# Patient Record
Sex: FEMALE | Race: WHITE | Employment: UNEMPLOYED | URBAN - METROPOLITAN AREA
[De-identification: names, ages, dates, MRNs, and addresses within clinical notes are randomized per-mention and may not be internally consistent; named-entity substitution may affect disease eponyms.]

---

## 2018-07-21 ENCOUNTER — HOSPITAL ENCOUNTER (EMERGENCY)
Age: 23
Discharge: HOME | End: 2018-07-21
Attending: STUDENT IN AN ORGANIZED HEALTH CARE EDUCATION/TRAINING PROGRAM | Admitting: STUDENT IN AN ORGANIZED HEALTH CARE EDUCATION/TRAINING PROGRAM
Payer: SELF-PAY

## 2018-07-21 VITALS
HEART RATE: 70 BPM | TEMPERATURE: 98.2 F | RESPIRATION RATE: 16 BRPM | SYSTOLIC BLOOD PRESSURE: 124 MMHG | DIASTOLIC BLOOD PRESSURE: 80 MMHG | BODY MASS INDEX: 23.83 KG/M2 | WEIGHT: 126.2 LBS | HEIGHT: 61 IN | OXYGEN SATURATION: 100 %

## 2018-07-21 DIAGNOSIS — Z71.1 CONCERN ABOUT STD IN FEMALE WITHOUT DIAGNOSIS: Primary | ICD-10-CM

## 2018-07-21 LAB
ERYTHROCYTE [DISTWIDTH] IN BLOOD BY AUTOMATED COUNT: 12.8 % (ref 11.5–14.5)
HCG UR QL: NEGATIVE
HCT VFR BLD AUTO: 38.5 % (ref 35–47)
HGB BLD-MCNC: 13 G/DL (ref 11.5–16)
MCH RBC QN AUTO: 30.7 PG (ref 26–34)
MCHC RBC AUTO-ENTMCNC: 33.8 G/DL (ref 30–36.5)
MCV RBC AUTO: 90.8 FL (ref 80–99)
NRBC # BLD: 0 K/UL (ref 0–0.01)
NRBC BLD-RTO: 0 PER 100 WBC
PLATELET # BLD AUTO: 290 K/UL (ref 150–400)
PMV BLD AUTO: 10.3 FL (ref 8.9–12.9)
RBC # BLD AUTO: 4.24 M/UL (ref 3.8–5.2)
WBC # BLD AUTO: 7.2 K/UL (ref 3.6–11)

## 2018-07-21 PROCEDURE — 36600 WITHDRAWAL OF ARTERIAL BLOOD: CPT

## 2018-07-21 PROCEDURE — 87529 HSV DNA AMP PROBE: CPT | Performed by: STUDENT IN AN ORGANIZED HEALTH CARE EDUCATION/TRAINING PROGRAM

## 2018-07-21 PROCEDURE — 36415 COLL VENOUS BLD VENIPUNCTURE: CPT | Performed by: STUDENT IN AN ORGANIZED HEALTH CARE EDUCATION/TRAINING PROGRAM

## 2018-07-21 PROCEDURE — 81025 URINE PREGNANCY TEST: CPT

## 2018-07-21 PROCEDURE — 85027 COMPLETE CBC AUTOMATED: CPT | Performed by: STUDENT IN AN ORGANIZED HEALTH CARE EDUCATION/TRAINING PROGRAM

## 2018-07-21 PROCEDURE — 99284 EMERGENCY DEPT VISIT MOD MDM: CPT

## 2018-07-21 RX ORDER — ACYCLOVIR 400 MG/1
400 TABLET ORAL
Qty: 35 TAB | Refills: 0 | Status: SHIPPED | OUTPATIENT
Start: 2018-07-21 | End: 2018-07-28

## 2018-07-21 NOTE — ED TRIAGE NOTES
Pt reports exposure to herpes 7/8/2018. Test negative on 7/12 but had elevated wbc. Pt reports lips are tingling and wants to be retested.

## 2018-07-21 NOTE — ED PROVIDER NOTES
HPI Comments: 21 y.o. female with past medical history significant for anxiety who presents from home via private vehicle with chief complaint of skin problem. Pt states she was exposed to herpes via a sexual encounter on 7/8, and was tested negative on 7/12. Pt notes at that time she was found to have an elevated WBC count, but was told it could be a recent hornet sting. Pt reports last night she started with \"bumps\" on her lips that are burning and tingling. Pt reports concern that she has herpes and would like to be retested. Pt notes she is in the sun a lot, and states it might be from prolonged sun exposure. Pt reports having an IUD in place, is currently on her cycle. Pt notes she is a patient at the [facility], and has a Naltrexone implant for detox. Pt denies any genital lesions. There are no other acute medical concerns at this time. Social hx: Nonsmoker; No EtOH use    Note written by Marcio Jones, as dictated by Vivien Lieberman MD 8:11 AM    The history is provided by the patient. No  was used. Past Medical History:   Diagnosis Date    Anxiety        History reviewed. No pertinent surgical history. History reviewed. No pertinent family history. Social History     Social History    Marital status: N/A     Spouse name: N/A    Number of children: N/A    Years of education: N/A     Occupational History    Not on file. Social History Main Topics    Smoking status: Never Smoker    Smokeless tobacco: Never Used    Alcohol use No    Drug use: No    Sexual activity: Not on file     Other Topics Concern    Not on file     Social History Narrative    No narrative on file         ALLERGIES: Neosporin [hydrocortisone]    Review of Systems   Constitutional: Negative for chills and fever. HENT: Negative for sore throat. Respiratory: Negative for cough and shortness of breath. Cardiovascular: Negative for chest pain.    Gastrointestinal: Negative for abdominal pain and vomiting. Genitourinary: Negative for dysuria. Musculoskeletal: Negative for back pain. Skin: Negative for rash. (+) Bumps on upper lip   Neurological: Negative for syncope and headaches. Psychiatric/Behavioral: Negative for confusion. All other systems reviewed and are negative. Vitals:    07/21/18 0749   BP: 125/70   Pulse: 61   Resp: 16   Temp: 98.1 °F (36.7 °C)   SpO2: 98%   Weight: 57.2 kg (126 lb 3.2 oz)   Height: 5' 1\" (1.549 m)            Physical Exam   Constitutional: She is oriented to person, place, and time. She appears well-developed. No distress. HENT:   Head: Normocephalic and atraumatic. No obvious lesions. Eyes: Conjunctivae and EOM are normal. Pupils are equal, round, and reactive to light. Neck: Normal range of motion. Neck supple. Cardiovascular: Normal rate, regular rhythm and normal heart sounds. No murmur heard. Pulmonary/Chest: Effort normal and breath sounds normal. No respiratory distress. Abdominal: Soft. Bowel sounds are normal. She exhibits no distension. There is no tenderness. There is no rebound. Musculoskeletal: Normal range of motion. She exhibits no edema. Neurological: She is alert and oriented to person, place, and time. No cranial nerve deficit. She exhibits normal muscle tone. Coordination normal.   Skin: Skin is warm and dry. No rash noted. Psychiatric: She has a normal mood and affect. Her behavior is normal.   Nursing note and vitals reviewed.      Note written by Marcio Anne, as dictated by Yuan Murdock MD 8:11 AM    [facility]      ED Course       Procedures

## 2018-07-21 NOTE — ED NOTES
Pt requesting MD to re-evaluate her lip claiming \"its getting bigger just sitting here. \"  Notified MD.  Also notified ER MD multiple PIV blood draw for CBC without success from 3 different nurses.

## 2018-07-22 LAB
HSV1 DNA SPEC QL NAA+PROBE: NEGATIVE
HSV2 DNA SPEC QL NAA+PROBE: NEGATIVE
SPECIMEN SOURCE: NORMAL

## 2018-09-10 ENCOUNTER — HOSPITAL ENCOUNTER (EMERGENCY)
Age: 23
Discharge: HOME | End: 2018-09-10
Attending: EMERGENCY MEDICINE | Admitting: EMERGENCY MEDICINE
Payer: SELF-PAY

## 2018-09-10 VITALS
TEMPERATURE: 98.3 F | HEIGHT: 61 IN | BODY MASS INDEX: 24.58 KG/M2 | RESPIRATION RATE: 16 BRPM | SYSTOLIC BLOOD PRESSURE: 116 MMHG | OXYGEN SATURATION: 100 % | WEIGHT: 130.2 LBS | HEART RATE: 73 BPM | DIASTOLIC BLOOD PRESSURE: 78 MMHG

## 2018-09-10 DIAGNOSIS — K92.0 HEMATEMESIS, PRESENCE OF NAUSEA NOT SPECIFIED: Primary | ICD-10-CM

## 2018-09-10 PROCEDURE — 74011250637 HC RX REV CODE- 250/637: Performed by: EMERGENCY MEDICINE

## 2018-09-10 PROCEDURE — 74011000250 HC RX REV CODE- 250: Performed by: EMERGENCY MEDICINE

## 2018-09-10 PROCEDURE — 99283 EMERGENCY DEPT VISIT LOW MDM: CPT

## 2018-09-10 RX ORDER — ONDANSETRON 4 MG/1
4 TABLET, ORALLY DISINTEGRATING ORAL
Qty: 12 TAB | Refills: 0 | Status: SHIPPED | OUTPATIENT
Start: 2018-09-10 | End: 2018-11-07

## 2018-09-10 RX ORDER — ONDANSETRON 4 MG/1
4 TABLET, ORALLY DISINTEGRATING ORAL
Status: COMPLETED | OUTPATIENT
Start: 2018-09-10 | End: 2018-09-10

## 2018-09-10 RX ORDER — FAMOTIDINE 20 MG/1
20 TABLET, FILM COATED ORAL
Status: COMPLETED | OUTPATIENT
Start: 2018-09-10 | End: 2018-09-10

## 2018-09-10 RX ORDER — SUCRALFATE 1 G/10ML
1 SUSPENSION ORAL 4 TIMES DAILY
Qty: 400 ML | Refills: 0 | Status: SHIPPED | OUTPATIENT
Start: 2018-09-10 | End: 2018-09-20

## 2018-09-10 RX ADMIN — LIDOCAINE HYDROCHLORIDE 40 ML: 20 SOLUTION ORAL; TOPICAL at 10:14

## 2018-09-10 RX ADMIN — ONDANSETRON 4 MG: 4 TABLET, ORALLY DISINTEGRATING ORAL at 10:14

## 2018-09-10 RX ADMIN — FAMOTIDINE 20 MG: 20 TABLET ORAL at 10:14

## 2018-09-10 NOTE — ED NOTES
Discharge instructionsgiven to patient by provider with opportunity to ask questions. Pt verbalizing understanding. VSS. NAD. Pt left ED ambualtory

## 2018-09-10 NOTE — LETTER
NOTIFICATION RETURN TO WORK / SCHOOL 
 
9/10/2018 10:42 AM 
 
Ms. Porsha Chandler [place] [id number] [facility] [id number] To Whom It May Concern: 
 
Porsha Chandler is currently under the care of [address]. She will return to work/school on: 9/12/18 If there are questions or concerns please have the patient contact our office. Sincerely, Barbara Rowland NP

## 2018-09-10 NOTE — DISCHARGE INSTRUCTIONS
Upper Gastrointestinal Bleeding: Care Instructions  Your Care Instructions    The digestive or gastrointestinal tract goes from the mouth to the anus. It is often called the GI tract. Bleeding in the upper GI tract can happen anywhere from the esophagus to the first part of the small intestine. Sometimes it's caused by an ulcer in your stomach. Or it may be caused by blood vessels in your esophagus. Your esophagus is the tube that carries food from your throat to your stomach. Light bleeding may not cause any symptoms at first. But if you continue to bleed for a while, you may feel very weak or tired. Sudden, heavy bleeding means you need to see a doctor right away. This kind of bleeding can be very dangerous. But it can usually be cured or controlled. The doctor may do some tests to find the cause of your bleeding. Follow-up care is a key part of your treatment and safety. Be sure to make and go to all appointments, and call your doctor if you are having problems. It's also a good idea to know your test results and keep a list of the medicines you take. How can you care for yourself at home? · Be safe with medicines. Take your medicines exactly as prescribed. Call your doctor if you think you are having a problem with your medicine. You will get more details on the specific medicines your doctor prescribes. · Do not take aspirin or other anti-inflammatory medicines, such as naproxen (Aleve) or ibuprofen (Advil, Motrin), without talking to your doctor first. Ask your doctor if it is okay to use acetaminophen (Tylenol). · Do not drink alcohol. · The bleeding may make you lose iron. So it's important to eat foods that have a lot of iron. These include red meat, shellfish, poultry, and eggs. They also include beans, raisins, whole-grain breads, and leafy green vegetables. If you want help planning meals, you can meet with a dietitian. When should you call for help?   Call 911 anytime you think you may need emergency care. For example, call if:    · You have sudden, severe belly pain.     · You vomit blood or what looks like coffee grounds.     · You passed out (lost consciousness).     · Your stools are maroon or very bloody.    Call your doctor now or seek immediate medical care if:    · You are dizzy or lightheaded, or you feel like you may faint.     · Your stools are black and look like tar.     · You have belly pain.     · You vomit or have nausea.     · You have trouble swallowing, or it hurts when you swallow.    Watch closely for changes in your health, and be sure to contact your doctor if you do not get better as expected. Where can you learn more? Go to http://rigo-clarke.info/. Enter F079 in the search box to learn more about \"Upper Gastrointestinal Bleeding: Care Instructions. \"  Current as of: November 20, 2017  Content Version: 11.7  © 1888-1167 [employer]. Care instructions adapted under license by [employer] (which disclaims liability or warranty for this information). If you have questions about a medical condition or this instruction, always ask your healthcare professional. [place] [address] any warranty or liability for your use of this information.

## 2018-09-10 NOTE — LETTER
NOTIFICATION RETURN TO WORK / SCHOOL 
 
9/10/2018 10:41 AM 
 
Ms. Allyssa Melo [address] [id number] To Whom It May Concern: 
 
Allyssa Melo is currently under the care of [address]. She will return to work/school on: 9/11/18 If there are questions or concerns please have the patient contact our office. Sincerely, Chelsy Andrade NP

## 2018-09-10 NOTE — ED PROVIDER NOTES
Patient is a 21 y.o. female presenting with vomiting. Vomiting Blood Pertinent negatives include no abdominal pain, no diarrhea, no headaches and no headaches. Pt states that she had too much to drink last night and has has multiple episodes of vomiting this morning; the last vomiting episode she states leonard streaks of blood. Denies fever, cough, cold symptoms, neck pain, visual changes, focal weakness or rash. Denies any difficulty breathing, difficulty swallowing, SOB or chest pain. Denies any urinary symptoms, constipation or diarrhea. Pt. Reports that she has not had any food or medications since yesterday. Past Medical History:  
Diagnosis Date  Alcohol poisoning  Anxiety History reviewed. No pertinent surgical history. History reviewed. No pertinent family history. Social History Social History  Marital status: SINGLE Spouse name: N/A  
 Number of children: N/A  
 Years of education: N/A Occupational History  Not on file. Social History Main Topics  Smoking status: Never Smoker  Smokeless tobacco: Never Used  Alcohol use Yes Comment: \"too much\"  Drug use: Yes Special: Marijuana Comment: last use 9/9/2018  Sexual activity: Not on file Other Topics Concern  Not on file Social History Narrative ALLERGIES: Neosporin [hydrocortisone] Review of Systems Constitutional: Negative for activity change and appetite change. HENT: Negative for facial swelling, sore throat and trouble swallowing. Eyes: Negative. Respiratory: Negative for shortness of breath. Cardiovascular: Negative. Gastrointestinal: Positive for nausea and vomiting. Negative for abdominal pain and diarrhea. Genitourinary: Negative for dysuria. Musculoskeletal: Negative for back pain and neck pain. Skin: Negative for color change. Neurological: Negative for headaches. Psychiatric/Behavioral: Negative. Vitals: 09/10/18 1747 Pulse: 67 Resp: 18 Temp: 97.9 °F (36.6 °C) SpO2: 99% Weight: 59.1 kg (130 lb 3.2 oz) Height: 5' 1\" (1.549 m) Physical Exam  
Constitutional: She is oriented to person, place, and time. She appears well-nourished. White female; smoker;  HENT:  
Head: Normocephalic. Right Ear: External ear normal.  
Left Ear: External ear normal.  
Mouth/Throat: Oropharynx is clear and moist.  
Eyes: Conjunctivae and EOM are normal. Pupils are equal, round, and reactive to light. Neck: Normal range of motion. Neck supple. Cardiovascular: Normal rate and regular rhythm. Pulmonary/Chest: Effort normal and breath sounds normal.  
Abdominal: Soft. Bowel sounds are normal. She exhibits no distension and no mass. There is no tenderness. There is no rebound and no guarding. Musculoskeletal: Normal range of motion. Lymphadenopathy:  
  She has no cervical adenopathy. Neurological: She is alert and oriented to person, place, and time. Skin: Skin is warm and dry. No rash noted. Nursing note and vitals reviewed. MDM 
 
 
ED Course Procedures Pt has been re-examined and is tolerating fluids well. Encouraged close follow up with gastroenterology. 10:35 AM 
Patient's results and plan of care have been reviewed with her. Patient has verbally conveyed her understanding and agreement of her signs, symptoms, diagnosis, treatment and prognosis and additionally agrees to follow up as recommended or return to the Emergency Room should her condition change prior to follow-up. Discharge instructions have also been provided to the patient with some educational information regarding her diagnosis as well a list of reasons why she would want to return to the ER prior to her follow-up appointment should her condition change. Discussed plan of care with Dr. Bobbi Hall.  Kimber Gurrola NP

## 2018-09-10 NOTE — ED TRIAGE NOTES
Triage note: \"I think I may have given myself alcohol poisoning again. I started vomiting blood this morning. \"  Pt states she drank 4-5 rum runners last night.

## 2018-11-07 ENCOUNTER — HOSPITAL ENCOUNTER (EMERGENCY)
Age: 23
Discharge: HOME | End: 2018-11-07
Attending: EMERGENCY MEDICINE
Payer: COMMERCIAL

## 2018-11-07 VITALS
HEART RATE: 84 BPM | TEMPERATURE: 98.3 F | BODY MASS INDEX: 24.55 KG/M2 | RESPIRATION RATE: 16 BRPM | HEIGHT: 61 IN | SYSTOLIC BLOOD PRESSURE: 93 MMHG | DIASTOLIC BLOOD PRESSURE: 58 MMHG | OXYGEN SATURATION: 100 % | WEIGHT: 130 LBS

## 2018-11-07 DIAGNOSIS — R11.2 NON-INTRACTABLE VOMITING WITH NAUSEA, UNSPECIFIED VOMITING TYPE: Primary | ICD-10-CM

## 2018-11-07 PROCEDURE — 99283 EMERGENCY DEPT VISIT LOW MDM: CPT

## 2018-11-07 PROCEDURE — 74011250637 HC RX REV CODE- 250/637: Performed by: EMERGENCY MEDICINE

## 2018-11-07 RX ORDER — ONDANSETRON 4 MG/1
8 TABLET, ORALLY DISINTEGRATING ORAL
Status: COMPLETED | OUTPATIENT
Start: 2018-11-07 | End: 2018-11-07

## 2018-11-07 RX ORDER — ONDANSETRON 2 MG/ML
4 INJECTION INTRAMUSCULAR; INTRAVENOUS
Status: DISCONTINUED | OUTPATIENT
Start: 2018-11-07 | End: 2018-11-07

## 2018-11-07 RX ORDER — IBUPROFEN 600 MG/1
600 TABLET ORAL
Status: COMPLETED | OUTPATIENT
Start: 2018-11-07 | End: 2018-11-07

## 2018-11-07 RX ORDER — ONDANSETRON 8 MG/1
8 TABLET, ORALLY DISINTEGRATING ORAL
Qty: 12 TAB | Refills: 0 | Status: SHIPPED | OUTPATIENT
Start: 2018-11-07

## 2018-11-07 RX ORDER — DICYCLOMINE HYDROCHLORIDE 10 MG/1
20 CAPSULE ORAL
Status: COMPLETED | OUTPATIENT
Start: 2018-11-07 | End: 2018-11-07

## 2018-11-07 RX ORDER — KETOROLAC TROMETHAMINE 30 MG/ML
15 INJECTION, SOLUTION INTRAMUSCULAR; INTRAVENOUS
Status: DISCONTINUED | OUTPATIENT
Start: 2018-11-07 | End: 2018-11-07

## 2018-11-07 RX ORDER — DICYCLOMINE HYDROCHLORIDE 10 MG/1
10 CAPSULE ORAL 4 TIMES DAILY
Qty: 20 CAP | Refills: 0 | Status: SHIPPED | OUTPATIENT
Start: 2018-11-07 | End: 2018-11-12

## 2018-11-07 RX ADMIN — ONDANSETRON 8 MG: 4 TABLET, ORALLY DISINTEGRATING ORAL at 02:19

## 2018-11-07 RX ADMIN — IBUPROFEN 600 MG: 600 TABLET ORAL at 02:34

## 2018-11-07 RX ADMIN — DICYCLOMINE HYDROCHLORIDE 20 MG: 10 CAPSULE ORAL at 02:34

## 2018-11-07 NOTE — ED NOTES
Pt arrived to ED with c/o abd pain with vomiting since today. Pt states \"I think its food poisoning\" Pt admits to eating at [address] recently. Pt reports last episode of vomiting was 10 mins PTA. Pt is in no acute distress. Will continue to monitor. See nursing assessment. Safety precautions in place; call light within reach. Emergency Department Nursing Plan of Care The Nursing Plan of Care is developed from the Nursing assessment and Emergency Department Attending provider initial evaluation. The plan of care may be reviewed in the ED Provider note. The Plan of Care was developed with the following considerations:  
Patient / Family readiness to learn indicated by:verbalized understanding Persons(s) to be included in education: patient Barriers to Learning/Limitations:No 
 
Signed Lisa Bravo RN   
11/7/2018   1:54 AM

## 2018-11-07 NOTE — ED PROVIDER NOTES
EMERGENCY DEPARTMENT HISTORY AND PHYSICAL EXAM 
 
 
Date: 11/7/2018 Patient Name: Ghada Villanueva History of Presenting Illness Chief Complaint Patient presents with  Vomiting History Provided By: Patient HPI: Ghada Villanueva, 21 y.o. female with PMHx significant for anxiety, presents ambulatory to the ED with cc of intermittent episodes of NV that woke her up from sleep tonight. Pt states last episodes was shortly PTA. She reports associated mild abd cramping. She denies any alleviating or exacerbating factors. She denies recent sick contact with similar symptoms. She states she had [employer] for dinner and thinks symptoms may be attributed to food poisoning. She denies recent marijuana use. She notes she has an IUD and denies chance of pregnancy. Pt specifically denies any fever, chills, cough, congestion, diarrhea. There are no other complaints, changes, or physical findings at this time. PCP: Kelley, MD Rusty 
 
Current Facility-Administered Medications Medication Dose Route Frequency Provider Last Rate Last Dose  ondansetron (ZOFRAN ODT) tablet 8 mg  8 mg Oral NOW [facility], DO      
 dicyclomine (BENTYL) capsule 20 mg  20 mg Oral NOW [facility], DO      
 ibuprofen (MOTRIN) tablet 600 mg  600 mg Oral NOW [facility], DO      
 
Current Outpatient Medications Medication Sig Dispense Refill  copper (PARAGARD T 380A IU) by IntraUTERine route. Past History Past Medical History: 
Past Medical History:  
Diagnosis Date  Alcohol poisoning  Anxiety Past Surgical History: 
History reviewed. No pertinent surgical history. Family History: 
History reviewed. No pertinent family history. Social History: 
Social History Tobacco Use  Smoking status: Never Smoker  Smokeless tobacco: Never Used Substance Use Topics  Alcohol use: Yes Comment: \"too much\"  Drug use: Yes Types: Marijuana Allergies: Allergies Allergen Reactions  Neosporin [Hydrocortisone] Rash Review of Systems Review of Systems Constitutional: Negative for chills and fever. HENT: Negative for congestion and sore throat. Eyes: Negative for visual disturbance. Respiratory: Negative for cough and shortness of breath. Cardiovascular: Negative for chest pain and leg swelling. Gastrointestinal: Positive for abdominal pain, nausea and vomiting. Negative for blood in stool and diarrhea. Endocrine: Negative for polyuria. Genitourinary: Negative for dysuria, flank pain, vaginal bleeding and vaginal discharge. Musculoskeletal: Negative for myalgias. Skin: Negative for rash. Allergic/Immunologic: Negative for immunocompromised state. Neurological: Negative for weakness and headaches. Psychiatric/Behavioral: Negative for confusion. Physical Exam  
Physical Exam  
Constitutional: She is oriented to person, place, and time. She appears well-developed and well-nourished. HENT:  
Head: Normocephalic and atraumatic. Moist mucous membranes Eyes: Conjunctivae are normal. Pupils are equal, round, and reactive to light. Right eye exhibits no discharge. Left eye exhibits no discharge. Neck: Normal range of motion. Neck supple. No tracheal deviation present. Cardiovascular: Normal rate, regular rhythm and normal heart sounds. No murmur heard. Pulmonary/Chest: Effort normal and breath sounds normal. No respiratory distress. She has no wheezes. She has no rales. Abdominal: Soft. Bowel sounds are normal. There is no tenderness. There is no rebound and no guarding. Musculoskeletal: Normal range of motion. She exhibits no edema, tenderness or deformity. Neurological: She is alert and oriented to person, place, and time. Skin: Skin is warm and dry. No rash noted. No erythema. Psychiatric: Her behavior is normal.  
Nursing note and vitals reviewed. Diagnostic Study Results Labs - 
 No results found for this or any previous visit (from the past 12 hour(s)). Radiologic Studies - No orders to display CT Results  (Last 48 hours) None CXR Results  (Last 48 hours) None Medical Decision Making I am the first provider for this patient. I reviewed the vital signs, available nursing notes, past medical history, past surgical history, family history and social history. Vital Signs-Reviewed the patient's vital signs. Patient Vitals for the past 12 hrs: 
 Temp Pulse Resp BP SpO2  
11/07/18 0146 98.3 °F (36.8 °C) 84 16 93/58 100 % Records Reviewed: Nursing Notes and Old Medical Records Provider Notes (Medical Decision Making): Consistent with viral gastritis vs pancreatitis vs food poisoning vs cannabinoid hyperemesis. Patient non toxic, just started vomiting. No labs or imaging warranted at this time. Will treat symptoms, perform serial exam, discharge if symptoms resolve with symptomatic treatment and close return precautions. ED Course:  
Initial assessment performed. The patients presenting problems have been discussed, and they are in agreement with the care plan formulated and outlined with them. I have encouraged them to ask questions as they arise throughout their visit. 
 
3:06 AM 
Patient feeling better. Will PO trial. If PO trial successful patient can be discharged to home. Critical Care Time:  
none Disposition: 
DISCHARGE NOTE The patient has been re-evaluated and is ready for discharge. Reviewed available results with patient. Counseled pt on diagnosis and care plan. Pt has expressed understanding, and all questions have been answered. Pt agrees with plan and agrees to follow up as recommended, or return to the ED if their symptoms worsen. Discharge instructions have been provided and explained to the pt, along with reasons to return to the ED. PLAN: 
1. Current Discharge Medication List  
  
 
2. Follow-up Information None Return to ED if worse Diagnosis Clinical Impression: 1. Non-intractable vomiting with nausea, unspecified vomiting type Attestations: This note is prepared by Julita Tanner, acting as Scribe for Dorcas Villanueva DO. Dorcas Villanueva DO: The scribe's documentation has been prepared under my direction and personally reviewed by me in its entirety. I confirm that the note above accurately reflects all work, treatment, procedures, and medical decision making performed by me.

## 2018-11-07 NOTE — DISCHARGE INSTRUCTIONS
Nausea and Vomiting: Care Instructions  Your Care Instructions    When you are nauseated, you may feel weak and sweaty and notice a lot of saliva in your mouth. Nausea often leads to vomiting. Most of the time you do not need to worry about nausea and vomiting, but they can be signs of other illnesses. Two common causes of nausea and vomiting are stomach flu and food poisoning. Nausea and vomiting from viral stomach flu will usually start to improve within 24 hours. Nausea and vomiting from food poisoning may last from 12 to 48 hours. The doctor has checked you carefully, but problems can develop later. If you notice any problems or new symptoms, get medical treatment right away. Follow-up care is a key part of your treatment and safety. Be sure to make and go to all appointments, and call your doctor if you are having problems. It's also a good idea to know your test results and keep a list of the medicines you take. How can you care for yourself at home? · To prevent dehydration, drink plenty of fluids, enough so that your urine is light yellow or clear like water. Choose water and other caffeine-free clear liquids until you feel better. If you have kidney, heart, or liver disease and have to limit fluids, talk with your doctor before you increase the amount of fluids you drink. · Rest in bed until you feel better. · When you are able to eat, try clear soups, mild foods, and liquids until all symptoms are gone for 12 to 48 hours. Other good choices include dry toast, crackers, cooked cereal, and gelatin dessert, such as Jell-O. When should you call for help? Call 911 anytime you think you may need emergency care. For example, call if:    · You passed out (lost consciousness).    Call your doctor now or seek immediate medical care if:    · You have symptoms of dehydration, such as:  ? Dry eyes and a dry mouth. ? Passing only a little dark urine. ?  Feeling thirstier than usual.     · You have new or worsening belly pain.     · You have a new or higher fever.     · You vomit blood or what looks like coffee grounds.    Watch closely for changes in your health, and be sure to contact your doctor if:    · You have ongoing nausea and vomiting.     · Your vomiting is getting worse.     · Your vomiting lasts longer than 2 days.     · You are not getting better as expected. Where can you learn more? Go to http://rigo-clarke.info/. Enter 25 [id number] in the search box to learn more about \"Nausea and Vomiting: Care Instructions. \"  Current as of: November 20, 2017  Content Version: 11.8  © 5821-6442 [employer]. Care instructions adapted under license by [employer] (which disclaims liability or warranty for this information). If you have questions about a medical condition or this instruction, always ask your healthcare professional. [address] any warranty or liability for your use of this information.

## 2018-11-07 NOTE — ED NOTES
Dr Refugio Hussein at bedside reviewing patient's discharge instructions and reviewing medications. Patient ambulatory home. Patient in no apparent distress.

## 2019-05-24 ENCOUNTER — OFFICE VISIT (OUTPATIENT)
Dept: OBGYN CLINIC | Age: 24
End: 2019-05-24

## 2019-05-24 VITALS
DIASTOLIC BLOOD PRESSURE: 62 MMHG | SYSTOLIC BLOOD PRESSURE: 130 MMHG | HEIGHT: 61 IN | BODY MASS INDEX: 24.13 KG/M2 | WEIGHT: 127.8 LBS

## 2019-05-24 DIAGNOSIS — R10.2 PELVIC PAIN IN FEMALE: ICD-10-CM

## 2019-05-24 DIAGNOSIS — R30.9 PAIN WITH URINATION: Primary | ICD-10-CM

## 2019-05-24 LAB
BILIRUB UR QL STRIP: NEGATIVE
GLUCOSE UR-MCNC: NEGATIVE MG/DL
KETONES P FAST UR STRIP-MCNC: NEGATIVE MG/DL
PH UR STRIP: 4.6 [PH] (ref 4.6–8)
PROT UR QL STRIP: NEGATIVE
SP GR UR STRIP: 1 (ref 1–1.03)
UA UROBILINOGEN AMB POC: NORMAL (ref 0.2–1)
URINALYSIS CLARITY POC: CLEAR
URINALYSIS COLOR POC: YELLOW
URINE BLOOD POC: NEGATIVE
URINE LEUKOCYTES POC: NEGATIVE
URINE NITRITES POC: NEGATIVE

## 2019-05-24 NOTE — PATIENT INSTRUCTIONS
Pelvic Pain: Care Instructions  Your Care Instructions    Pelvic pain, or pain in the lower belly, can have many causes. Often pelvic pain is not serious and gets better in a few days. If your pain continues or gets worse, you may need tests and treatment. Tell your doctor about any new symptoms. These may be signs of a serious problem. Follow-up care is a key part of your treatment and safety. Be sure to make and go to all appointments, and call your doctor if you are having problems. It's also a good idea to know your test results and keep a list of the medicines you take. How can you care for yourself at home? · Rest until you feel better. Lie down, and raise your legs by placing a pillow under your knees. · Drink plenty of fluids. You may find that small, frequent sips are easier on your stomach than if you drink a lot at once. Avoid drinks with carbonation or caffeine, such as soda pop, tea, or coffee. · Try eating several small meals instead of 2 or 3 large ones. Eat mild foods, such as rice, dry toast or crackers, bananas, and applesauce. Avoid fatty and spicy foods, other fruits, and alcohol until 48 hours after your symptoms have gone away. · Take an over-the-counter pain medicine, such as acetaminophen (Tylenol), ibuprofen (Advil, Motrin), or naproxen (Aleve). Read and follow all instructions on the label. · Do not take two or more pain medicines at the same time unless the doctor told you to. Many pain medicines have acetaminophen, which is Tylenol. Too much acetaminophen (Tylenol) can be harmful. · You can put a heating pad, a warm cloth, or moist heat on your belly to relieve pain. When should you call for help?   Call your doctor now or seek immediate medical care if:    · You have a new or higher fever.     · You have unusual vaginal bleeding.     · You have new or worse belly or pelvic pain.     · You have vaginal discharge that has increased in amount or smells bad.    Watch closely for changes in your health, and be sure to contact your doctor if:    · You do not get better as expected. Where can you learn more? Go to http://rigo-clarke.info/. Enter [id number] in the search box to learn more about \"Pelvic Pain: Care Instructions. \"  Current as of: May 14, 2018  Content Version: 11.9  © 5534-0405 [employer]. Care instructions adapted under license by [employer] (which disclaims liability or warranty for this information). If you have questions about a medical condition or this instruction, always ask your healthcare professional. [place] [address] any warranty or liability for your use of this information.

## 2019-05-24 NOTE — PROGRESS NOTES
Pelvic Pain evaluation    Porsha Chandler is a 25 y.o. female who complains of pelvic pain. The pain is described as constant and stabbing, and is 7/10 in intensity. Pain is located in the suprapubic with radiation to RLQ. GI irregularity and some DUB. Paragard IUD. Flight of ideas and symptoms. On Keflex (self start)    The pain started 1 1/2 years ago. Her symptoms have been unchanged since. Aggravating factors: movement and activity. Alleviating factors: none. Associated symptoms: dysuria, urinary frequency and dyspareunia. The patient denies constipation, hematuria and nausea. Ultrasound today showed:  UTERUS IS ANTEVERTED, NORMAL IN SIZE AND ECHOGENICITY. ENDOMETRIUM MEASURES 5-6MM IN THICKNESS. NO EVIDENCE OF MASS OR ABNORMALITY SEEN  WITHIN THE ENDOMETRIAL CAVITY. IUD IS SEEN IN THE PROPER POSITION WITHIN THE ENDOMETRIAL CAVITY IN THE UTERINE FUNDUS. RIGHT OVARY APPEARS WITHIN NORMAL LIMITS. LEFT OVARY APPEARS WITHIN NORMAL LIMITS. NO FREE FLUID SEEN IN THE CDS. Past Medical History:   Diagnosis Date    Alcohol poisoning     Anxiety     Encounter for IUD insertion 2014    Heroin use     Pap smear abnormality of cervix/human papillomavirus (HPV) positive      History reviewed. No pertinent surgical history. Social History     Occupational History    Not on file   Tobacco Use    Smoking status: Never Smoker    Smokeless tobacco: Never Used   Substance and Sexual Activity    Alcohol use: Yes     Comment: \"too much\"    Drug use: Yes     Types: Marijuana, Heroin    Sexual activity: Yes     Partners: Male     Birth control/protection: IUD     Family History   Problem Relation Age of Onset    Breast Cancer Mother     Stroke Mother     Hypertension Father        Allergies   Allergen Reactions    Neosporin [Hydrocortisone] Rash     Prior to Admission medications    Medication Sig Start Date End Date Taking?  Authorizing Provider   copper (PARAGARD T 380A IU) by IntraUTERine route. Other, MD Rusty   ondansetron (ZOFRAN ODT) 8 mg disintegrating tablet Take 1 Tab by mouth every eight (8) hours as needed for Nausea. 11/7/18   Melchor Mcwilliams DO        Review of Systems: History obtained from the patient  Constitutional: negative for weight loss, fever, night sweats  Breast: negative for breast lumps, nipple discharge, galactorrhea  GI: negative for change in bowel habits, abdominal pain, black or bloody stools  : negative for frequency, dysuria, hematuria, vaginal discharge  MSK: negative for back pain, joint pain, muscle pain  Skin: negative for itching, rash, hives  Psych: negative for anxiety, depression, change in mood      Objective:    Visit Vitals  /62   Ht 5' 1\" (1.549 m)   Wt 127 lb 12.8 oz (58 kg)   LMP 05/02/2019 (Exact Date)   BMI 24.15 kg/m²       Physical Exam:     Constitutional  · Appearance: well-nourished, well developed, alert, in no acute distress    Skin  · General Inspection: no rash, no lesions identified    Neurologic/Psychiatric  · Mental Status:  · Orientation: grossly oriented to person, place and time  · Mood and Affect: mood normal, affect appropriate  Recent Results (from the past 12 hour(s))   AMB POC URINALYSIS DIP STICK MANUAL W/O MICRO    Collection Time: 05/24/19  9:49 AM   Result Value Ref Range    Color (UA POC) Yellow     Clarity (UA POC) Clear     Glucose (UA POC) Negative Negative    Bilirubin (UA POC) Negative Negative    Ketones (UA POC) Negative Negative    Specific gravity (UA POC) 1.001 1.001 - 1.035    Blood (UA POC) Negative Negative    pH (UA POC) 4.6 4.6 - 8.0    Protein (UA POC) Negative Negative    Urobilinogen (UA POC) normal 0.2 - 1    Nitrites (UA POC) Negative Negative    Leukocyte esterase (UA POC) Negative Negative         Assessment:  Pain not related to GYN. Advised normal US and no need for any further medication since she's already on Keflex. Has been recently tested for [facility] and CT so declines testing for that.      Plan: RTO PRN. I spent 30 minutes with the patient, more than half of which was face to face counseling. RTO prn if symptoms persist or worsen. Instructions given to pt. Handouts given to pt.

## 2019-05-26 LAB — BACTERIA UR CULT: NORMAL

## 2019-05-28 ENCOUNTER — TELEPHONE (OUTPATIENT)
Dept: OBGYN CLINIC | Age: 24
End: 2019-05-28

## 2019-05-28 NOTE — TELEPHONE ENCOUNTER
Call received at 2:05PM  [address] [place] [address] Dr Martínez patient     25year old patient last seen in the office on 5/24/19. Patient had ultrasound and urine culture done. Assessment:  Pain not related to GYN. Advised normal US and no need for any further medication since she's already on Keflex. Has been recently tested for [facility] and CT so declines testing for that. Patient states she continues to have symptoms of burning upon urination, frequency ,urgency . Patient reports she completed the Keflex (which was an old prescription and not complete)    Patient knows she has a uti from previously.      Urine culture results have been reviewed by , no comment

## 2019-08-08 ENCOUNTER — OFFICE VISIT (OUTPATIENT)
Dept: OBGYN CLINIC | Age: 24
End: 2019-08-08

## 2019-08-08 VITALS
HEIGHT: 61 IN | BODY MASS INDEX: 26.43 KG/M2 | WEIGHT: 140 LBS | RESPIRATION RATE: 16 BRPM | DIASTOLIC BLOOD PRESSURE: 77 MMHG | TEMPERATURE: 96.9 F | HEART RATE: 85 BPM | SYSTOLIC BLOOD PRESSURE: 108 MMHG

## 2019-08-08 DIAGNOSIS — R10.2 PELVIC PAIN IN FEMALE: Primary | ICD-10-CM

## 2019-08-08 LAB
BILIRUB UR QL STRIP: NEGATIVE
GLUCOSE UR-MCNC: NEGATIVE MG/DL
KETONES P FAST UR STRIP-MCNC: NEGATIVE MG/DL
PH UR STRIP: 5.5 [PH] (ref 4.6–8)
PROT UR QL STRIP: NEGATIVE
SP GR UR STRIP: 1.03 (ref 1–1.03)
UA UROBILINOGEN AMB POC: NORMAL (ref 0.2–1)
URINALYSIS CLARITY POC: CLEAR
URINALYSIS COLOR POC: YELLOW
URINE BLOOD POC: NEGATIVE
URINE LEUKOCYTES POC: NEGATIVE
URINE NITRITES POC: NEGATIVE

## 2019-08-08 RX ORDER — METRONIDAZOLE 500 MG/1
500 TABLET ORAL 2 TIMES DAILY
Qty: 14 TAB | Refills: 0 | Status: SHIPPED | OUTPATIENT
Start: 2019-08-08 | End: 2019-08-15

## 2019-08-08 NOTE — PROGRESS NOTES
Vaginitis evaluation    Chief Complaint   Abdominal Pain and Vaginitis      HPI  25 y.o. female G1 Ab1 using Paragard complains of white, creamy, foul and malodorous vaginal discharge for about 2 weeks. Pt. Describes it having a amononia smell. Pt. Further describes pain with intercourse however no bleeding with intercourse. She describes her pain as left periumbilical that radiates slightly downward. She denies any bowel habit changes. Pt. Does describe increased urinary pressure infrequently. Pt. Reports a history of BV. Pt. Reports her periods as monthly and her last menses was normal.  She does not have any concern for pregnancy. Pt. Does have history of abnormal pap with HPV +, however she did not follow up as instructed. She will be schedule to return for a pap test once the possibility of infection has been evaluated and treated. Patient's last menstrual period was 07/25/2019. She denies additional symptoms at this time. The patient denies aggravating factors. She is not concerned about possible STI exposure at this time. She denies exposure to new chemicals ot hygenic agents  Previous treatment included: none    Past Medical History:   Diagnosis Date    Alcohol poisoning     Anxiety     Depression 2016    Encounter for IUD insertion 2014    Heroin use     Pap smear abnormality of cervix/human papillomavirus (HPV) positive      History reviewed. No pertinent surgical history.   Social History     Occupational History    Not on file   Tobacco Use    Smoking status: Never Smoker    Smokeless tobacco: Never Used   Substance and Sexual Activity    Alcohol use: Yes     Comment: \"too much\"    Drug use: Yes     Types: Marijuana, Heroin    Sexual activity: Yes     Partners: Male     Birth control/protection: IUD     Family History   Problem Relation Age of Onset    Breast Cancer Mother     Stroke Mother     Hypertension Father         Allergies   Allergen Reactions    Neosporin [Hydrocortisone] Rash     Prior to Admission medications    Medication Sig Start Date End Date Taking? Authorizing Provider   metroNIDAZOLE (FLAGYL) 500 mg tablet Take 1 Tab by mouth two (2) times a day for 7 days. 8/8/19 8/15/19 Yes Liberty Pollen, NP   copper (PARAGARD T 380A IU) by IntraUTERine route. Yes Other, MD Rusty   ondansetron (ZOFRAN ODT) 8 mg disintegrating tablet Take 1 Tab by mouth every eight (8) hours as needed for Nausea.   Patient not taking: Reported on 8/8/2019 11/7/18   Barbara Momin,                       Review of Systems - History obtained from the patient  Constitutional: negative for weight loss, fever, night sweats  Breast: negative for breast lumps, nipple discharge, galactorrhea  GI: negative for change in bowel habits, abdominal pain, black or bloody stools  : negative for frequency, dysuria, hematuria  MSK: negative for back pain, joint pain, muscle pain  Skin: negative for itching, rash, hives  Neuro: negative for dizziness, headache, confusion, weakness  Psych: negative for anxiety, depression, change in mood  Heme/lymph: negative for bleeding, bruising, pallor       Objective:    Visit Vitals  /77 (BP 1 Location: Right arm, BP Patient Position: Sitting)   Pulse 85   Temp 96.9 °F (36.1 °C) (Oral)   Resp 16   Ht 5' 1\" (1.549 m)   Wt 140 lb (63.5 kg)   LMP 07/25/2019   BMI 26.45 kg/m²       Physical Exam:   PHYSICAL EXAMINATION    Constitutional  · Appearance: well-nourished, well developed, alert, in no acute distress    HENT  · Head and Face: appears normal    Genitourinary  · External Genitalia: normal appearance for age, no discharge present, no tenderness present, no inflammatory lesions present, no masses present, no atrophy present  · Vagina:  Heavy, creamy discharge present, otherwise normal vaginal vault without central or paravaginal defects, no inflammatory lesions present, no masses present  · Bladder: non-tender to palpation  · Urethra: appears normal  · Cervix: normal, IUD string seen   · Uterus: normal size, shape and consistency  · Adnexa: no adnexal tenderness present, no adnexal masses present  · Perineum: perineum within normal limits, no evidence of trauma, no rashes or skin lesions present  · Anus: anus within normal limits, no hemorrhoids present  · Inguinal Lymph Nodes: no lymphadenopathy present    Skin  · General Inspection: no rash, no lesions identified    Neurologic/Psychiatric  · Mental Status:  · Orientation: grossly oriented to person, place and time  · Mood and Affect: mood normal, affect appropriate      Urine dipstick:  negative for all components. .    No results found for any visits on 08/08/19. Assessment:   bacterial vaginosis    Plan:   Treatment: Flagyl 500 BID x 7 days and abstain from coitus during course of treatment  Nuswab plus taken. Recent pelvic ultrasound taken in late May 2019- WNL. RTO in 1-2 months for well woman with pap test.     ROV prn if symptoms persist or worsen.

## 2019-08-08 NOTE — PROGRESS NOTES
Chief Complaint   Patient presents with    Abdominal Pain     Chief Complaint   Patient presents with    Abdominal Pain     Pt states abdominal pain started a few days ago. Pt states also pain with sex for a few weeks. Pt c/o of ammonia smelling discharge for a few weeks. Pt states history of abnormal Pap, pt also requests STD testing. 1. Have you been to the ER, urgent care clinic since your last visit? Hospitalized since your last visit? No    2. Have you seen or consulted any other health care providers outside of the [address] [place] [address] since your last visit? Include any pap smears or colon screening.  No     3 most recent PHQ Screens 8/8/2019   Little interest or pleasure in doing things Nearly every day   Feeling down, depressed, irritable, or hopeless Nearly every day   Total Score PHQ 2 6

## 2019-08-12 ENCOUNTER — TELEPHONE (OUTPATIENT)
Dept: OBGYN CLINIC | Age: 24
End: 2019-08-12

## 2019-08-12 LAB
A VAGINAE DNA VAG QL NAA+PROBE: ABNORMAL SCORE
BVAB2 DNA VAG QL NAA+PROBE: ABNORMAL SCORE
C ALBICANS DNA VAG QL NAA+PROBE: POSITIVE
C GLABRATA DNA VAG QL NAA+PROBE: NEGATIVE
C TRACH RRNA SPEC QL NAA+PROBE: NEGATIVE
MEGA1 DNA VAG QL NAA+PROBE: ABNORMAL SCORE
N GONORRHOEA RRNA SPEC QL NAA+PROBE: NEGATIVE
T VAGINALIS RRNA SPEC QL NAA+PROBE: NEGATIVE

## 2019-08-12 RX ORDER — FLUCONAZOLE 150 MG/1
150 TABLET ORAL DAILY
Qty: 1 TAB | Refills: 0 | Status: SHIPPED | OUTPATIENT
Start: 2019-08-12 | End: 2019-08-13

## 2019-08-12 NOTE — PROGRESS NOTES
Please notify pt that she was + for yeast infection and medication will be sent to pharmacy. She was negative for BV, GC, CT and trich.

## 2019-08-14 NOTE — PROGRESS NOTES
Called pt no answer mailbox is full could not LVM. Second attempt to reach patient letter written and routed to [place] to print and mail.

## 2019-08-26 ENCOUNTER — TELEPHONE (OUTPATIENT)
Dept: OBGYN CLINIC | Age: 24
End: 2019-08-26

## 2019-08-26 NOTE — TELEPHONE ENCOUNTER
Pt called office was given results per CAIO Gu NP:  Please notify pt that she was + for yeast infection and medication will be sent to pharmacy. Sacha Gonsalo was negative for BV, GC, CT and trich. Pt verbalized understanding to all.

## 2019-09-05 ENCOUNTER — HOSPITAL ENCOUNTER (OUTPATIENT)
Dept: LAB | Age: 24
Discharge: HOME | End: 2019-09-05
Payer: COMMERCIAL

## 2019-09-05 ENCOUNTER — OFFICE VISIT (OUTPATIENT)
Dept: OBGYN CLINIC | Age: 24
End: 2019-09-05

## 2019-09-05 VITALS
WEIGHT: 142 LBS | TEMPERATURE: 97.8 F | RESPIRATION RATE: 18 BRPM | BODY MASS INDEX: 26.81 KG/M2 | DIASTOLIC BLOOD PRESSURE: 74 MMHG | HEART RATE: 80 BPM | SYSTOLIC BLOOD PRESSURE: 110 MMHG | HEIGHT: 61 IN | OXYGEN SATURATION: 98 %

## 2019-09-05 DIAGNOSIS — Z01.419 WELL WOMAN EXAM WITH ROUTINE GYNECOLOGICAL EXAM: Primary | ICD-10-CM

## 2019-09-05 PROCEDURE — 88175 CYTOPATH C/V AUTO FLUID REDO: CPT

## 2019-09-05 RX ORDER — TRIAMCINOLONE ACETONIDE 1 MG/G
OINTMENT TOPICAL 2 TIMES DAILY
Qty: 30 G | Refills: 1 | Status: SHIPPED | OUTPATIENT
Start: 2019-09-05

## 2019-09-05 NOTE — PROGRESS NOTES
Chief Complaint   Patient presents with    Well Woman     Pt reports no complaints. 3 most recent PHQ Screens 9/5/2019   Little interest or pleasure in doing things Not at all   Feeling down, depressed, irritable, or hopeless Not at all   Total Score PHQ 2 0     1. Have you been to the ER, urgent care clinic since your last visit? Hospitalized since your last visit? No    2. Have you seen or consulted any other health care providers outside of the [address] [place] [address] since your last visit? Include any pap smears or colon screening.  No

## 2019-09-27 ENCOUNTER — OFFICE VISIT (OUTPATIENT)
Dept: OBGYN CLINIC | Age: 24
End: 2019-09-27

## 2019-09-27 VITALS
SYSTOLIC BLOOD PRESSURE: 106 MMHG | DIASTOLIC BLOOD PRESSURE: 63 MMHG | HEART RATE: 80 BPM | WEIGHT: 143 LBS | RESPIRATION RATE: 18 BRPM | BODY MASS INDEX: 27 KG/M2 | HEIGHT: 61 IN | OXYGEN SATURATION: 98 %

## 2019-09-27 DIAGNOSIS — N89.8 VAGINAL DISCHARGE: ICD-10-CM

## 2019-09-27 DIAGNOSIS — N30.00 ACUTE CYSTITIS WITHOUT HEMATURIA: ICD-10-CM

## 2019-09-27 DIAGNOSIS — R30.9 PAIN WITH URINATION: Primary | ICD-10-CM

## 2019-09-27 DIAGNOSIS — Z20.2 POSSIBLE EXPOSURE TO STD: ICD-10-CM

## 2019-09-27 LAB
BILIRUB UR QL STRIP: NEGATIVE
GLUCOSE UR-MCNC: NEGATIVE MG/DL
KETONES P FAST UR STRIP-MCNC: NEGATIVE MG/DL
PH UR STRIP: 7 [PH] (ref 4.6–8)
PROT UR QL STRIP: NEGATIVE
SP GR UR STRIP: 1.01 (ref 1–1.03)
UA UROBILINOGEN AMB POC: NORMAL (ref 0.2–1)
URINALYSIS CLARITY POC: CLEAR
URINALYSIS COLOR POC: YELLOW
URINE BLOOD POC: NORMAL
URINE LEUKOCYTES POC: NORMAL
URINE NITRITES POC: NEGATIVE

## 2019-09-27 RX ORDER — FLUCONAZOLE 150 MG/1
150 TABLET ORAL SEE ADMIN INSTRUCTIONS
Qty: 2 TAB | Refills: 0 | Status: SHIPPED | OUTPATIENT
Start: 2019-09-27

## 2019-09-27 RX ORDER — NITROFURANTOIN 25; 75 MG/1; MG/1
100 CAPSULE ORAL 2 TIMES DAILY
Qty: 14 CAP | Refills: 0 | Status: SHIPPED | OUTPATIENT
Start: 2019-09-27 | End: 2019-10-04

## 2019-09-27 NOTE — PROGRESS NOTES
UTI note    Jaya Friend is a ,  25 y.o. female [facility] who presents today with had concerns including Urinary Pain. .     Patient with pain with urination. +frequency. Hard to start stream.  Also external irritation. Minimal discharge. Does have concern for STD    Discomfort is in the suprapubic area and does not radiate. Symptoms are not alleviated by hydration. Symptoms are exacerbated with urination. Patient does have a history of recurrent UTI. She does not have a history of pyelonephritis. She has a history of  has a past medical history of Alcohol poisoning, Anxiety, Depression (), Encounter for IUD insertion (), Heroin use, and Pap smear abnormality of cervix/human papillomavirus (HPV) positive. with the following surgical history  has no past surgical history on file. .  . She has not been evaluated for her current complaints.       Results for orders placed or performed in visit on 19   AMB POC URINALYSIS DIP STICK AUTO W/O MICRO     Status: None   Result Value Ref Range Status    Color (UA POC) Yellow  Final    Clarity (UA POC) Clear  Final    Glucose (UA POC) Negative Negative Final    Bilirubin (UA POC) Negative Negative Final    Ketones (UA POC) Negative Negative Final    Specific gravity (UA POC) 1.010 1.001 - 1.035 Final    Blood (UA POC) Trace Negative Final    pH (UA POC) 7.0 4.6 - 8.0 Final    Protein (UA POC) Negative Negative Final    Urobilinogen (UA POC) 0.2 mg/dL 0.2 - 1 Final    Nitrites (UA POC) Negative Negative Final    Leukocyte esterase (UA POC) 1+ Negative Final   Results for orders placed or performed in visit on 19   AMB POC URINALYSIS DIP STICK MANUAL W/O MICRO     Status: None   Result Value Ref Range Status    Color (UA POC) Yellow  Final    Clarity (UA POC) Clear  Final    Glucose (UA POC) Negative Negative Final    Bilirubin (UA POC) Negative Negative Final    Ketones (UA POC) Negative Negative Final    Specific gravity (UA POC) 1.030 1.001 - 1.035 Final    Blood (UA POC) Negative Negative Final    pH (UA POC) 5.5 4.6 - 8.0 Final    Protein (UA POC) Negative Negative Final    Urobilinogen (UA POC) 0.2 mg/dL 0.2 - 1 Final    Nitrites (UA POC) Negative Negative Final    Leukocyte esterase (UA POC) Negative Negative Final       Past Medical History:   Diagnosis Date    Alcohol poisoning     Anxiety     Depression 2016    Encounter for IUD insertion 2014    Heroin use     Pap smear abnormality of cervix/human papillomavirus (HPV) positive      History reviewed. No pertinent surgical history. Social History     Occupational History    Not on file   Tobacco Use    Smoking status: Never Smoker    Smokeless tobacco: Never Used   Substance and Sexual Activity    Alcohol use: Yes     Comment: \"too much\"    Drug use: Yes     Types: Marijuana, Heroin    Sexual activity: Yes     Partners: Male     Birth control/protection: IUD     Family History   Problem Relation Age of Onset    Breast Cancer Mother     Stroke Mother     Hypertension Father        Allergies   Allergen Reactions    Neosporin [Neomycin-Bacitracnzn-Polymyxnb] Rash     Prior to Admission medications    Medication Sig Start Date End Date Taking? Authorizing Provider   copper (PARAGARD T 380A IU) by IntraUTERine route. Yes Other, MD Rusty   triamcinolone acetonide (KENALOG) 0.1 % ointment Apply  to affected area two (2) times a day. use thin layer 9/5/19   Lito Malagon NP   ondansetron (ZOFRAN ODT) 8 mg disintegrating tablet Take 1 Tab by mouth every eight (8) hours as needed for Nausea.   Patient not taking: Reported on 8/8/2019 11/7/18   Dylan Murphy, DO        Review of Systems: History obtained from the patient  Constitutional: negative for weight loss, fever, night sweats  Breast: negative for breast lumps, nipple discharge, galactorrhea  GI: negative for change in bowel habits, abdominal pain, black or bloody stools  : see HPI, negative for vaginal discharge  MSK: negative for back pain, joint pain, muscle pain  Skin: negative for itching, rash, hives  Psych: negative for anxiety, depression, change in mood      Objective:    Visit Vitals  /63 (BP 1 Location: Left arm, BP Patient Position: Sitting)   Pulse 80   Resp 18   Ht 5' 1\" (1.549 m)   Wt 143 lb (64.9 kg)   LMP 09/15/2019   SpO2 98%   BMI 27.02 kg/m²       Physical Exam:   PHYSICAL EXAMINATION    Constitutional  · Appearance: well-nourished, well developed, alert, in no acute distress    Gastrointestinal  · Abdominal Examination: abdomen non-tender to palpation, normal bowel sounds, no masses present  · Liver and spleen: no hepatomegaly present, spleen not palpable  · Hernias: no hernias identified    Genitourinary  · External Genitalia: normal appearance for age, no discharge present, no tenderness present, no inflammatory lesions present, no masses present, no atrophy present  · Vagina: normal vaginal vault without central or paravaginal defects, moderate white discharge present, no inflammatory lesions present, no masses present  · Bladder: tender to palpation  · Urethra: appears normal  · Cervix: normal   · Uterus: normal size, shape and consistency  · Adnexa: no adnexal tenderness present, no adnexal masses present  · Perineum: perineum within normal limits, no evidence of trauma, no rashes or skin lesions present  · Anus: anus within normal limits, no hemorrhoids present  · Inguinal Lymph Nodes: no lymphadenopathy present    Skin  · General Inspection: no rash, no lesions identified    Neurologic/Psychiatric  · Mental Status:  · Orientation: grossly oriented to person, place and time  · Mood and Affect: mood normal, affect appropriate    Assessment:   UTI    Plan:   - UA consistent with UTI  - macrobid  - prophylactic diflucan  - nuswab plus. Will call with results.

## 2019-09-27 NOTE — PATIENT INSTRUCTIONS
Urinary Tract Infection in Women: Care Instructions  Your Care Instructions    A urinary tract infection, or UTI, is a general term for an infection anywhere between the kidneys and the urethra (where urine comes out). Most UTIs are bladder infections. They often cause pain or burning when you urinate. UTIs are caused by bacteria and can be cured with antibiotics. Be sure to complete your treatment so that the infection goes away. Follow-up care is a key part of your treatment and safety. Be sure to make and go to all appointments, and call your doctor if you are having problems. It's also a good idea to know your test results and keep a list of the medicines you take. How can you care for yourself at home? · Take your antibiotics as directed. Do not stop taking them just because you feel better. You need to take the full course of antibiotics. · Drink extra water and other fluids for the next day or two. This may help wash out the bacteria that are causing the infection. (If you have kidney, heart, or liver disease and have to limit fluids, talk with your doctor before you increase your fluid intake.)  · Avoid drinks that are carbonated or have caffeine. They can irritate the bladder. · Urinate often. Try to empty your bladder each time. · To relieve pain, take a hot bath or lay a heating pad set on low over your lower belly or genital area. Never go to sleep with a heating pad in place. To prevent UTIs  · Drink plenty of water each day. This helps you urinate often, which clears bacteria from your system. (If you have kidney, heart, or liver disease and have to limit fluids, talk with your doctor before you increase your fluid intake.)  · Urinate when you need to. · Urinate right after you have sex. · Change sanitary pads often. · Avoid douches, bubble baths, feminine hygiene sprays, and other feminine hygiene products that have deodorants.   · After going to the bathroom, wipe from front to back.  When should you call for help? Call your doctor now or seek immediate medical care if:    · Symptoms such as fever, chills, nausea, or vomiting get worse or appear for the first time.     · You have new pain in your back just below your rib cage. This is called flank pain.     · There is new blood or pus in your urine.     · You have any problems with your antibiotic medicine.    Watch closely for changes in your health, and be sure to contact your doctor if:    · You are not getting better after taking an antibiotic for 2 days.     · Your symptoms go away but then come back. Where can you learn more? Go to http://rigo-clarke.info/. Enter C957 in the search box to learn more about \"Urinary Tract Infection in Women: Care Instructions. \"  Current as of: December 19, 2018  Content Version: 12.2  © 7667-7213 [employer]. Care instructions adapted under license by [employer] (which disclaims liability or warranty for this information). If you have questions about a medical condition or this instruction, always ask your healthcare professional. [address] any warranty or liability for your use of this information.

## 2019-09-27 NOTE — PROGRESS NOTES
Chief Complaint   Patient presents with    Urinary Pain     Pt reports burning at the end of urination and slight vaginal irration x a few days. 3 most recent PHQ Screens 9/27/2019   Little interest or pleasure in doing things Not at all   Feeling down, depressed, irritable, or hopeless Not at all   Total Score PHQ 2 0     1. Have you been to the ER, urgent care clinic since your last visit? Hospitalized since your last visit? No    2. Have you seen or consulted any other health care providers outside of the [address] [place] [address] since your last visit? Include any pap smears or colon screening.  No    Results for orders placed or performed in visit on 09/27/19   AMB POC URINALYSIS DIP STICK AUTO W/O MICRO   Result Value Ref Range    Color (UA POC) Yellow     Clarity (UA POC) Clear     Glucose (UA POC) Negative Negative    Bilirubin (UA POC) Negative Negative    Ketones (UA POC) Negative Negative    Specific gravity (UA POC) 1.010 1.001 - 1.035    Blood (UA POC) Trace Negative    pH (UA POC) 7.0 4.6 - 8.0    Protein (UA POC) Negative Negative    Urobilinogen (UA POC) 0.2 mg/dL 0.2 - 1    Nitrites (UA POC) Negative Negative    Leukocyte esterase (UA POC) 1+ Negative

## 2019-10-02 LAB
A VAGINAE DNA VAG QL NAA+PROBE: NORMAL SCORE
BVAB2 DNA VAG QL NAA+PROBE: NORMAL SCORE
C ALBICANS DNA VAG QL NAA+PROBE: NEGATIVE
C GLABRATA DNA VAG QL NAA+PROBE: NEGATIVE
C TRACH RRNA SPEC QL NAA+PROBE: NEGATIVE
MEGA1 DNA VAG QL NAA+PROBE: NORMAL SCORE
N GONORRHOEA RRNA SPEC QL NAA+PROBE: NEGATIVE
T VAGINALIS RRNA SPEC QL NAA+PROBE: NEGATIVE

## 2019-10-02 NOTE — PROGRESS NOTES
Please let patient know that her swab was completely normal and negative for BV, yeast infection, Trich, and GC/Chlamydia.   Thank you

## 2019-10-04 ENCOUNTER — TELEPHONE (OUTPATIENT)
Dept: OBGYN CLINIC | Age: 24
End: 2019-10-04

## 2019-10-04 RX ORDER — SULFAMETHOXAZOLE AND TRIMETHOPRIM 800; 160 MG/1; MG/1
1 TABLET ORAL 2 TIMES DAILY
Qty: 10 TAB | Refills: 0 | Status: SHIPPED | OUTPATIENT
Start: 2019-10-04 | End: 2019-10-09

## 2019-10-04 NOTE — TELEPHONE ENCOUNTER
Prescription for bactrim bid for 5 days sent to preferred pharmacy. If this does not relieve symptoms, she should return to clinic for re-evaluation. Thank you.

## 2019-10-04 NOTE — TELEPHONE ENCOUNTER
Pt state that she feels like the antibiotic is not working for her UTI. She would like another antibiotic called into the pharmacy.

## 2019-10-04 NOTE — TELEPHONE ENCOUNTER
Called pt no answer could not LVM due to mailbox being full was calling to advise pt that Rx was sent to pharmacy on file an dif symptoms do not go after taking abx she would need to come in for another office visit.

## 2019-10-09 ENCOUNTER — TELEPHONE (OUTPATIENT)
Dept: OBGYN CLINIC | Age: 24
End: 2019-10-09

## 2020-06-01 ENCOUNTER — HOSPITAL ENCOUNTER (OUTPATIENT)
Age: 25
Discharge: HOME | End: 2020-06-01
Payer: COMMERCIAL

## 2020-06-01 VITALS
SYSTOLIC BLOOD PRESSURE: 124 MMHG | DIASTOLIC BLOOD PRESSURE: 66 MMHG | RESPIRATION RATE: 18 BRPM | TEMPERATURE: 99 F | HEART RATE: 99 BPM | OXYGEN SATURATION: 96 %

## 2020-06-01 DIAGNOSIS — R30.0 DYSURIA: Primary | ICD-10-CM

## 2020-06-01 PROCEDURE — 81025 URINE PREGNANCY TEST: CPT

## 2020-06-01 PROCEDURE — 99204 OFFICE O/P NEW MOD 45 MIN: CPT | Performed by: NURSE PRACTITIONER

## 2020-06-01 PROCEDURE — 87086 URINE CULTURE/COLONY COUNT: CPT | Performed by: NURSE PRACTITIONER

## 2020-06-01 PROCEDURE — 81002 URINALYSIS NONAUTO W/O SCOPE: CPT

## 2020-06-01 RX ORDER — CEPHALEXIN 500 MG/1
500 CAPSULE ORAL 3 TIMES DAILY
Qty: 21 CAPSULE | Refills: 0 | Status: SHIPPED | OUTPATIENT
Start: 2020-06-01 | End: 2020-06-08

## 2020-06-01 RX ORDER — BUPRENORPHINE HYDROCHLORIDE AND NALOXONE HYDROCHLORIDE DIHYDRATE 8; 2 MG/1; MG/1
1 TABLET SUBLINGUAL DAILY
COMMUNITY

## 2020-06-01 NOTE — ED INITIAL ASSESSMENT (HPI)
PATIENT ARRIVED AMBULATORY TO ROOM C/O SYMPTOMS OF A UTI THAT STARTED 1 WEEK AGO. PATIENT STATES SHE HAD AN OLD SCRIPT FOR BACTRIM AT HOME AND HAS BEEN TAKING THAT FOR 5 DAYS WITH  NO RELIEF. NO ABDOMINAL PAIN. +LOWER BACK PAIN.  NO FEVERS. +FREQUENCY/URGEN

## 2020-06-01 NOTE — ED PROVIDER NOTES
Patient presents with:  Urinary Symptoms      HPI:     Katha Ganser is a 22year old female who presents with a chief complaint of dysuria, urgency, and frequency for the past week. The patient states she has been taking some leftover Bactrim.   She has b organization: Not on file        Attends meetings of clubs or organizations: Not on file        Relationship status: Not on file      Intimate partner violence:        Fear of current or ex partner: Not on file        Emotionally abused: Not on file urine Negative Negative mg/dL    Glucose, Urine Negative Negative mg/dL    Ketone, Urine 80  (A) Negative mg/dL    Bilirubin, Urine Negative Negative    Blood, Urine Negative Negative    Nitrite Urine Negative Negative    Urobilinogen urine <2.0 <2.0 mg/dL

## 2020-06-20 ENCOUNTER — HOSPITAL ENCOUNTER (EMERGENCY)
Facility: HOSPITAL | Age: 25
Discharge: HOME | End: 2020-06-21
Attending: EMERGENCY MEDICINE
Payer: COMMERCIAL

## 2020-06-20 DIAGNOSIS — N30.01 ACUTE CYSTITIS WITH HEMATURIA: Primary | ICD-10-CM

## 2020-06-20 DIAGNOSIS — T74.21XA SEXUAL ASSAULT OF ADULT, INITIAL ENCOUNTER: ICD-10-CM

## 2020-06-20 PROCEDURE — 81025 URINE PREGNANCY TEST: CPT

## 2020-06-20 PROCEDURE — A4216 STERILE WATER/SALINE, 10 ML: HCPCS

## 2020-06-20 PROCEDURE — 87491 CHLMYD TRACH DNA AMP PROBE: CPT | Performed by: EMERGENCY MEDICINE

## 2020-06-20 PROCEDURE — 36415 COLL VENOUS BLD VENIPUNCTURE: CPT

## 2020-06-20 PROCEDURE — 87340 HEPATITIS B SURFACE AG IA: CPT | Performed by: EMERGENCY MEDICINE

## 2020-06-20 PROCEDURE — 99285 EMERGENCY DEPT VISIT HI MDM: CPT

## 2020-06-20 PROCEDURE — 86706 HEP B SURFACE ANTIBODY: CPT | Performed by: EMERGENCY MEDICINE

## 2020-06-20 PROCEDURE — 90471 IMMUNIZATION ADMIN: CPT

## 2020-06-20 PROCEDURE — 87106 FUNGI IDENTIFICATION YEAST: CPT | Performed by: EMERGENCY MEDICINE

## 2020-06-20 PROCEDURE — 87808 TRICHOMONAS ASSAY W/OPTIC: CPT | Performed by: EMERGENCY MEDICINE

## 2020-06-20 PROCEDURE — 87591 N.GONORRHOEAE DNA AMP PROB: CPT | Performed by: EMERGENCY MEDICINE

## 2020-06-20 PROCEDURE — 87086 URINE CULTURE/COLONY COUNT: CPT | Performed by: EMERGENCY MEDICINE

## 2020-06-20 PROCEDURE — 86701 HIV-1ANTIBODY: CPT | Performed by: EMERGENCY MEDICINE

## 2020-06-20 PROCEDURE — 87088 URINE BACTERIA CULTURE: CPT | Performed by: EMERGENCY MEDICINE

## 2020-06-20 PROCEDURE — 80500 HEPATITIS B PROFILE WITH PATHOLOGY INTERPRETATION: CPT | Performed by: EMERGENCY MEDICINE

## 2020-06-20 PROCEDURE — 87205 SMEAR GRAM STAIN: CPT | Performed by: EMERGENCY MEDICINE

## 2020-06-20 PROCEDURE — 81001 URINALYSIS AUTO W/SCOPE: CPT | Performed by: EMERGENCY MEDICINE

## 2020-06-20 PROCEDURE — 87624 HPV HI-RISK TYP POOLED RSLT: CPT | Performed by: EMERGENCY MEDICINE

## 2020-06-20 PROCEDURE — 86704 HEP B CORE ANTIBODY TOTAL: CPT | Performed by: EMERGENCY MEDICINE

## 2020-06-20 PROCEDURE — 96372 THER/PROPH/DIAG INJ SC/IM: CPT

## 2020-06-20 PROCEDURE — 86780 TREPONEMA PALLIDUM: CPT | Performed by: EMERGENCY MEDICINE

## 2020-06-20 PROCEDURE — 87186 SC STD MICRODIL/AGAR DIL: CPT | Performed by: EMERGENCY MEDICINE

## 2020-06-20 RX ORDER — EMTRICITABINE AND TENOFOVIR DISOPROXIL FUMARATE 200; 300 MG/1; MG/1
1 TABLET, FILM COATED ORAL DAILY
Status: DISCONTINUED | OUTPATIENT
Start: 2020-06-20 | End: 2020-06-21

## 2020-06-20 RX ORDER — NITROFURANTOIN 25; 75 MG/1; MG/1
100 CAPSULE ORAL 2 TIMES DAILY
Qty: 10 CAPSULE | Refills: 0 | Status: SHIPPED | OUTPATIENT
Start: 2020-06-20 | End: 2020-06-25

## 2020-06-20 RX ORDER — AZITHROMYCIN 250 MG/1
1000 TABLET, FILM COATED ORAL ONCE
Status: COMPLETED | OUTPATIENT
Start: 2020-06-20 | End: 2020-06-20

## 2020-06-20 RX ORDER — ONDANSETRON 4 MG/1
4 TABLET, ORALLY DISINTEGRATING ORAL ONCE
Status: COMPLETED | OUTPATIENT
Start: 2020-06-20 | End: 2020-06-20

## 2020-06-20 RX ORDER — ACETAMINOPHEN 500 MG
1000 TABLET ORAL ONCE
Status: COMPLETED | OUTPATIENT
Start: 2020-06-20 | End: 2020-06-20

## 2020-06-20 RX ORDER — METRONIDAZOLE 500 MG/1
2000 TABLET ORAL ONCE
Status: COMPLETED | OUTPATIENT
Start: 2020-06-20 | End: 2020-06-20

## 2020-06-21 VITALS
DIASTOLIC BLOOD PRESSURE: 84 MMHG | HEIGHT: 61 IN | RESPIRATION RATE: 16 BRPM | TEMPERATURE: 98 F | HEART RATE: 88 BPM | WEIGHT: 160 LBS | OXYGEN SATURATION: 98 % | BODY MASS INDEX: 30.21 KG/M2 | SYSTOLIC BLOOD PRESSURE: 122 MMHG

## 2020-06-21 RX ORDER — EMTRICITABINE AND TENOFOVIR DISOPROXIL FUMARATE 200; 300 MG/1; MG/1
1 TABLET, FILM COATED ORAL DAILY
Qty: 28 TABLET | Refills: 0 | Status: SHIPPED | OUTPATIENT
Start: 2020-06-20 | End: 2020-07-18
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
## 2020-06-21 NOTE — ED NOTES
Farzana from [place] called back. Reports she is unable to come to ER d/t COVID but is able to speak with Abdias Turcios over phone is Abdias Turcios would like to. Patel updated and states she would like to speak with Nathaniel Salazar over the phone.      Phone call from Nathaniel Salazar trans
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
## 2020-06-21 NOTE — ED NOTES
Discharge instructions reviewed. Pt verbalized understanding with no further questions. Pain controlled. Steady gait. Speaking in full clear sentences at discharge.  Pt aware to  prescription at pharmacy stated on discharge instruction, with educatio

## 2020-06-21 NOTE — CM/SW NOTE
Called by Arelne Encarnacion to arrange discharge transportation for patient, however by the time [facility] was able to get into pt's room to do so patient had already arranged her own [facility], although initially she informed Arlene Encarnacion she did not have money for an [facility].

## 2020-10-03 ENCOUNTER — HOSPITAL ENCOUNTER (EMERGENCY)
Facility: HOSPITAL | Age: 25
Discharge: HOME | End: 2020-10-03
Attending: EMERGENCY MEDICINE
Payer: COMMERCIAL

## 2020-10-03 VITALS
OXYGEN SATURATION: 100 % | RESPIRATION RATE: 18 BRPM | BODY MASS INDEX: 26.43 KG/M2 | DIASTOLIC BLOOD PRESSURE: 88 MMHG | SYSTOLIC BLOOD PRESSURE: 134 MMHG | HEART RATE: 105 BPM | WEIGHT: 140 LBS | TEMPERATURE: 98 F | HEIGHT: 61 IN

## 2020-10-03 DIAGNOSIS — L03.119 CELLULITIS OF UPPER EXTREMITY, UNSPECIFIED LATERALITY: Primary | ICD-10-CM

## 2020-10-03 DIAGNOSIS — Z20.2 POSSIBLE EXPOSURE TO STD: ICD-10-CM

## 2020-10-03 PROCEDURE — 87205 SMEAR GRAM STAIN: CPT | Performed by: EMERGENCY MEDICINE

## 2020-10-03 PROCEDURE — 87591 N.GONORRHOEAE DNA AMP PROB: CPT | Performed by: EMERGENCY MEDICINE

## 2020-10-03 PROCEDURE — 81025 URINE PREGNANCY TEST: CPT

## 2020-10-03 PROCEDURE — 87106 FUNGI IDENTIFICATION YEAST: CPT | Performed by: EMERGENCY MEDICINE

## 2020-10-03 PROCEDURE — 96372 THER/PROPH/DIAG INJ SC/IM: CPT

## 2020-10-03 PROCEDURE — 87491 CHLMYD TRACH DNA AMP PROBE: CPT | Performed by: EMERGENCY MEDICINE

## 2020-10-03 PROCEDURE — 87808 TRICHOMONAS ASSAY W/OPTIC: CPT | Performed by: EMERGENCY MEDICINE

## 2020-10-03 PROCEDURE — 99284 EMERGENCY DEPT VISIT MOD MDM: CPT

## 2020-10-03 RX ORDER — CLINDAMYCIN HYDROCHLORIDE 300 MG/1
300 CAPSULE ORAL 3 TIMES DAILY
Qty: 30 CAPSULE | Refills: 0 | Status: SHIPPED | OUTPATIENT
Start: 2020-10-03 | End: 2020-10-13

## 2020-10-03 RX ORDER — AZITHROMYCIN 250 MG/1
1000 TABLET, FILM COATED ORAL ONCE
Status: COMPLETED | OUTPATIENT
Start: 2020-10-03 | End: 2020-10-03

## 2020-10-03 RX ORDER — ONDANSETRON 4 MG/1
4 TABLET, ORALLY DISINTEGRATING ORAL EVERY 4 HOURS PRN
Qty: 20 TABLET | Refills: 0 | Status: SHIPPED | OUTPATIENT
Start: 2020-10-03 | End: 2020-10-10

## 2020-10-04 NOTE — ED PROVIDER NOTES
Patient Seen in: [facility] AND [facility] Emergency Department      History   Patient presents with:  Cellulitis  Abscess    Stated Complaint: arm cellulitis R/T IV Drug use    HPI    25-year-old female presents for evaluation of cellulitis.   Patient with suspe Conjunctiva/sclera: Conjunctivae normal.   Neck:      Musculoskeletal: Normal range of motion and neck supple. Cardiovascular:      Rate and Rhythm: Normal rate and regular rhythm. Heart sounds: Normal heart sounds.    Pulmonary:      Effort: Pulmona diagnosis)  Possible exposure to STD    Disposition:  There is no disposition on file for this visit. There is no disposition time on file for this visit.     Follow-up:  Yeimi Love DO  [address] [id number] [facility]    Schedule

## 2020-10-04 NOTE — ED NOTES
Assumed care to this pt who came in ambulatory with steady gait to room 36 from triage for redness, swelling to bilateral upper extremity, cellulitis + abscess, related to IV drug used. Pt also requesting STD to be done.   Pt is A/O x 4, breathing is non l

## 2020-10-04 NOTE — ED INITIAL ASSESSMENT (HPI)
Patient reports bilateral upper extremity cellulitis R>L with abscesses r/t IV drug use. Patient also requesting STD testing- reports vaginal discharge, denies dysuria. Reports daily IV heroin use.

## 2021-02-03 ENCOUNTER — APPOINTMENT (OUTPATIENT)
Dept: ULTRASOUND IMAGING | Facility: HOSPITAL | Age: 26
End: 2021-02-03
Attending: EMERGENCY MEDICINE

## 2021-02-03 PROCEDURE — 93971 EXTREMITY STUDY: CPT | Performed by: EMERGENCY MEDICINE

## 2021-02-04 NOTE — ED INITIAL ASSESSMENT (HPI)
S: Skin problem. B: Patient states \"my arms are all fucked up. \" Patient has multiple sites at which she was using drugs at one point, she is now trying to get clean. Patient concerned that they may be infected.  Site appear to be healing no redness noted

## 2021-02-04 NOTE — ED PROVIDER NOTES
Patient Seen in: [facility] AND [facility] Emergency Department    History   Patient presents with:  Skin Problem    Stated Complaint: Cellulitis/IV Drug abuse.      HPI    51-year-old female with past medical history of IV heroin use ($50-$100 daily) presenting fo except as noted above. PSFH elements reviewed from today and agreed except as otherwise stated in HPI.     Physical Exam     ED Triage Vitals [02/03/21 2120]   /72   Pulse 89   Resp 18   Temp 97.8 °F (36.6 °C)   Temp src    SpO2 96 %   O2 Device No directly, please call [phone] (extension [phone]). If you can't reach me at this number, do not leave a voicemail.   Please call [phone] ext [phone] and ask for the next available Radiologist.      Jason Lugo MD  This report has been electronically sig total) by mouth 2 (two) times daily for 14 days.   Qty: 28 capsule Refills: 0    hydrOXYzine HCl 25 MG Oral Tab  Take 1 tablet (25 mg total) by mouth every 4 (four) hours as needed for Itching or Anxiety (Use caution while taking this medication and operati